# Patient Record
Sex: FEMALE | Race: WHITE | NOT HISPANIC OR LATINO | Employment: OTHER | URBAN - METROPOLITAN AREA
[De-identification: names, ages, dates, MRNs, and addresses within clinical notes are randomized per-mention and may not be internally consistent; named-entity substitution may affect disease eponyms.]

---

## 2020-09-08 ENCOUNTER — COMMUNICATION - HEALTHEAST (OUTPATIENT)
Dept: FAMILY MEDICINE | Facility: CLINIC | Age: 85
End: 2020-09-08

## 2020-09-09 ENCOUNTER — OFFICE VISIT - HEALTHEAST (OUTPATIENT)
Dept: FAMILY MEDICINE | Facility: CLINIC | Age: 85
End: 2020-09-09

## 2020-09-09 DIAGNOSIS — Z76.89 ENCOUNTER TO ESTABLISH CARE: ICD-10-CM

## 2020-09-09 DIAGNOSIS — R41.3 MEMORY DIFFICULTY: ICD-10-CM

## 2020-09-09 ASSESSMENT — MIFFLIN-ST. JEOR: SCORE: 1167.75

## 2020-11-16 ENCOUNTER — RECORDS - HEALTHEAST (OUTPATIENT)
Dept: ADMINISTRATIVE | Facility: OTHER | Age: 85
End: 2020-11-16

## 2020-11-24 ENCOUNTER — COMMUNICATION - HEALTHEAST (OUTPATIENT)
Dept: FAMILY MEDICINE | Facility: CLINIC | Age: 85
End: 2020-11-24

## 2020-12-03 ENCOUNTER — VIRTUAL VISIT (OUTPATIENT)
Dept: FAMILY MEDICINE | Facility: OTHER | Age: 85
End: 2020-12-03

## 2020-12-03 ENCOUNTER — AMBULATORY - HEALTHEAST (OUTPATIENT)
Dept: FAMILY MEDICINE | Facility: CLINIC | Age: 85
End: 2020-12-03

## 2020-12-03 DIAGNOSIS — Z20.822 SUSPECTED COVID-19 VIRUS INFECTION: ICD-10-CM

## 2020-12-03 NOTE — PROGRESS NOTES
"Date: 2020 10:24:01  Clinician: Jong Brantley  Clinician NPI: [id number]  Patient: Margarita Zimmerman  Patient : 1932  Patient Address: [facility] [place] [address]  Patient Phone: [phone]  Visit Protocol: URI  Patient Summary:  Margarita is a 88 year old ( : 1932 ) female who initiated a [facility] Visit for COVID-19 (Coronavirus) evaluation and screening. When asked the question \"Please sign me up to receive news, health information and promotions. \", Margarita responded \"No\".    When asked when her symptoms started, Margarita reported that she does not have any symptoms.   She denies taking antibiotic medication in the past month and having recent facial or sinus surgery in the past 60 days.    Pertinent COVID-19 (Coronavirus) information  Margarita does not work or volunteer as healthcare worker or a . In the past 14 days, Margarita has not worked or volunteered at a healthcare facility or group living setting.   In the past 14 days, she also has not lived in a congregate living setting.   Margarita has not had a close contact with a laboratory-confirmed COVID-19 patient within the last 14 days.    Since 2019, Margarita has not been tested for COVID-19 and has not had upper respiratory infection or influenza-like illness.   Pertinent medical history  She has not been told by her provider to avoid NSAIDs.   Margarita does not get yeast infections when she takes antibiotics.   Margarita does not have diabetes. She denies having immunosuppressive conditions (e.g., chemotherapy, HIV, organ transplant, long-term use of steroids or other immunosuppressive medications, splenectomy). She does not have severe COPD and congestive heart failure. She does not have asthma.   Margarita does not need a return to work/school note.   Weight: 160 lbs   Margarita does not smoke or use smokeless tobacco.   Weight: 160 lbs    MEDICATIONS: No current medications, ALLERGIES: NKDA  Clinician Response:  " Dear Margarita,   Based on your exposure to COVID-19 (coronavirus), we would like to test you for this virus.  1. Please call [phone] to schedule your visit. Explain that you were referred by [facility] to have a COVID-19 test. Be ready to share your [facility] visit ID number.  * If you need to schedule in [facility] please call [phone] or for [place] employees please call [phone].   * If you need to schedule in the [place] area please call [phone]. [place] employees call [phone].   The following will serve as your written order for this COVID Test, ordered by me, for the indication of suspected COVID [Z20.828]: The test will be ordered in [employer], our electronic health record, after you are scheduled. It will show as ordered and authorized by Maurizio Wei MD.  Order: COVID-19 (coronavirus) PCR for ASYMPTOMATIC EXPOSURE testing from [facility].   If you know you have had close contact with someone who tested positive, you should be quarantined for 14 days after this exposure. You should stay in quarantine for the14 days even if the covid test is negative, the optimal time to test after exposure is 5-7 days from the exposure  Quarantine means   What should I do?  For safety, it's very important to follow these rules. Do this for 14 days after the date you were last exposed to the virus..  Stay home and away from others. Don't go to school or anywhere else. Generally quarantine means staying home from work but there are some exceptions to this. Please contact your workplace.   No hugging, kissing or shaking hands.  Don't let anyone visit.  Cover your mouth and nose with a mask, tissue or washcloth to avoid spreading germs.  Wash your hands and face often. Use soap and water.  What are the symptoms of COVID-19?  The most common symptoms are cough, fever and trouble breathing. Less common symptoms include headache, body aches, fatigue (feeling very tired), chills, sore throat, stuffy or runny nose, diarrhea  (loose poop), loss of taste or smell, belly pain, and nausea or vomiting (feeling sick to your stomach or throwing up).  After 14 days, if you have still don't have symptoms, you likely don't have this virus.  If you develop symptoms, follow these guidelines.  If you're normally healthy: Please start another [facility] visit to report your symptoms. Go to [email or web address].  If you have a serious health problem (like cancer, heart failure, an organ transplant or kidney disease): Call your specialty clinic. Let them know that you might have COVID-19.  2. When it's time for your COVID test:  Stay at least 6 feet away from others. (If someone will drive you to your test, stay in the backseat, as far away from the  as you can.)  Cover your mouth and nose with a mask, tissue or washcloth.  Go straight to the testing site. Don't make any stops on the way there or back.  Please note  Caregivers in these groups are at risk for severe illness due to COVID-19:  o People 65 years and older  o People who live in a nursing home or long-term care facility  o People with chronic disease (lung, heart, cancer, diabetes, kidney, liver, immunologic)  o People who have a weakened immune system, including those who:  Are in cancer treatment  Take medicine that weakens the immune system, such as corticosteroids  Had a bone marrow or organ transplant  Have an immune deficiency  Have poorly controlled HIV or AIDS  Are obese (body mass index of 40 or higher)  Smoke regularly  Where can I get more information?  [facility] -- About COVID-19: [email or web address]/  CDC -- What to Do If You're Sick: www.cdc.gov/coronavirus/2019-ncov/about/steps-when-sick.html  CDC -- Ending Home Isolation: www.cdc.gov/coronavirus/2019-ncov/hcp/disposition-in-home-patients.html  CDC -- Caring for Someone: www.cdc.gov/coronavirus/2019-ncov/if-you-are-sick/care-for-someone.html  [facility] -- Interim Guidance for Hospital Discharge to Home:  www.health.[facility].[place]/diseases/coronavirus/hcp/hospdischarge.pdf  [facility] clinical trials (COVID-19 research studies): clinicalaffairs.[facility]/umn-clinical-trials  Below are the COVID-19 hotlines at the [facility] ([facility]). Interpreters are available.  For health questions: Call 510-238-4445 or 1-159.548.3647 (7 a.m. to 7 p.m.)  For questions about schools and childcare: Call 997-040-4150 or 1-783.587.2301 (7 a.m. to 7 p.m.)    Diagnosis: Contact with and (suspected) exposure to other viral communicable diseases  Diagnosis ICD: Z20.828

## 2020-12-04 ENCOUNTER — AMBULATORY - HEALTHEAST (OUTPATIENT)
Dept: FAMILY MEDICINE | Facility: CLINIC | Age: 85
End: 2020-12-04

## 2020-12-04 DIAGNOSIS — Z20.822 SUSPECTED COVID-19 VIRUS INFECTION: ICD-10-CM

## 2020-12-06 ENCOUNTER — COMMUNICATION - HEALTHEAST (OUTPATIENT)
Dept: SCHEDULING | Facility: CLINIC | Age: 85
End: 2020-12-06

## 2021-01-11 ENCOUNTER — RECORDS - HEALTHEAST (OUTPATIENT)
Dept: LAB | Facility: CLINIC | Age: 86
End: 2021-01-11

## 2021-01-12 LAB
ANION GAP SERPL CALCULATED.3IONS-SCNC: 7 MMOL/L (ref 5–18)
BUN SERPL-MCNC: 12 MG/DL (ref 8–28)
CALCIUM SERPL-MCNC: 8.3 MG/DL (ref 8.5–10.5)
CHLORIDE BLD-SCNC: 105 MMOL/L (ref 98–107)
CO2 SERPL-SCNC: 25 MMOL/L (ref 22–31)
CREAT SERPL-MCNC: 0.62 MG/DL (ref 0.6–1.1)
ERYTHROCYTE [DISTWIDTH] IN BLOOD BY AUTOMATED COUNT: 13.2 % (ref 11–14.5)
GFR SERPL CREATININE-BSD FRML MDRD: >60 ML/MIN/1.73M2
GLUCOSE BLD-MCNC: 69 MG/DL (ref 70–125)
HCT VFR BLD AUTO: 40.5 % (ref 35–47)
HGB BLD-MCNC: 13.4 G/DL (ref 12–16)
MCH RBC QN AUTO: 30.7 PG (ref 27–34)
MCHC RBC AUTO-ENTMCNC: 33.1 G/DL (ref 32–36)
MCV RBC AUTO: 93 FL (ref 80–100)
PLATELET # BLD AUTO: 393 THOU/UL (ref 140–440)
PMV BLD AUTO: 10.5 FL (ref 8.5–12.5)
POTASSIUM BLD-SCNC: 4.1 MMOL/L (ref 3.5–5)
RBC # BLD AUTO: 4.37 MILL/UL (ref 3.8–5.4)
SODIUM SERPL-SCNC: 137 MMOL/L (ref 136–145)
TSH SERPL DL<=0.005 MIU/L-ACNC: 1.5 UIU/ML (ref 0.3–5)
VIT B12 SERPL-MCNC: 824 PG/ML (ref 213–816)
WBC: 6.8 THOU/UL (ref 4–11)

## 2021-03-10 ENCOUNTER — HOSPITAL ENCOUNTER (EMERGENCY)
Facility: CLINIC | Age: 86
Discharge: HOME | End: 2021-03-10
Attending: EMERGENCY MEDICINE | Admitting: EMERGENCY MEDICINE
Payer: COMMERCIAL

## 2021-03-10 VITALS
TEMPERATURE: 98 F | RESPIRATION RATE: 18 BRPM | OXYGEN SATURATION: 96 % | HEIGHT: 67 IN | SYSTOLIC BLOOD PRESSURE: 117 MMHG | WEIGHT: 179 LBS | HEART RATE: 87 BPM | BODY MASS INDEX: 28.09 KG/M2 | DIASTOLIC BLOOD PRESSURE: 65 MMHG

## 2021-03-10 DIAGNOSIS — R00.2 PALPITATIONS: ICD-10-CM

## 2021-03-10 DIAGNOSIS — R42 EPISODE OF DIZZINESS: ICD-10-CM

## 2021-03-10 LAB
ALBUMIN UR-MCNC: 30 MG/DL
ANION GAP SERPL CALCULATED.3IONS-SCNC: 4 MMOL/L (ref 3–14)
APPEARANCE UR: CLEAR
BASOPHILS # BLD AUTO: 0 10E9/L (ref 0–0.2)
BASOPHILS NFR BLD AUTO: 0.6 %
BILIRUB UR QL STRIP: NEGATIVE
BUN SERPL-MCNC: 22 MG/DL (ref 7–30)
CALCIUM SERPL-MCNC: 8.8 MG/DL (ref 8.5–10.1)
CHLORIDE SERPL-SCNC: 107 MMOL/L (ref 94–109)
CO2 SERPL-SCNC: 31 MMOL/L (ref 20–32)
COLOR UR AUTO: YELLOW
CREAT SERPL-MCNC: 0.79 MG/DL (ref 0.52–1.04)
D DIMER PPP FEU-MCNC: 0.7 UG/ML FEU (ref 0–0.5)
DIFFERENTIAL METHOD BLD: NORMAL
EOSINOPHIL # BLD AUTO: 0.2 10E9/L (ref 0–0.7)
EOSINOPHIL NFR BLD AUTO: 3.3 %
ERYTHROCYTE [DISTWIDTH] IN BLOOD BY AUTOMATED COUNT: 12.8 % (ref 10–15)
GFR SERPL CREATININE-BSD FRML MDRD: 67 ML/MIN/{1.73_M2}
GLUCOSE BLDC GLUCOMTR-MCNC: 69 MG/DL (ref 70–99)
GLUCOSE BLDC GLUCOMTR-MCNC: 90 MG/DL (ref 70–99)
GLUCOSE BLDC GLUCOMTR-MCNC: 91 MG/DL (ref 70–99)
GLUCOSE SERPL-MCNC: 65 MG/DL (ref 70–99)
GLUCOSE UR STRIP-MCNC: NEGATIVE MG/DL
HCT VFR BLD AUTO: 46.4 % (ref 35–47)
HGB BLD-MCNC: 15 G/DL (ref 11.7–15.7)
HGB UR QL STRIP: NEGATIVE
IMM GRANULOCYTES # BLD: 0 10E9/L (ref 0–0.4)
IMM GRANULOCYTES NFR BLD: 0.3 %
INTERPRETATION ECG - MUSE: NORMAL
KETONES UR STRIP-MCNC: NEGATIVE MG/DL
LEUKOCYTE ESTERASE UR QL STRIP: ABNORMAL
LYMPHOCYTES # BLD AUTO: 2 10E9/L (ref 0.8–5.3)
LYMPHOCYTES NFR BLD AUTO: 27.9 %
MCH RBC QN AUTO: 29.9 PG (ref 26.5–33)
MCHC RBC AUTO-ENTMCNC: 32.3 G/DL (ref 31.5–36.5)
MCV RBC AUTO: 92 FL (ref 78–100)
MONOCYTES # BLD AUTO: 0.6 10E9/L (ref 0–1.3)
MONOCYTES NFR BLD AUTO: 7.9 %
MUCOUS THREADS #/AREA URNS LPF: PRESENT /LPF
NEUTROPHILS # BLD AUTO: 4.3 10E9/L (ref 1.6–8.3)
NEUTROPHILS NFR BLD AUTO: 60 %
NITRATE UR QL: NEGATIVE
NRBC # BLD AUTO: 0 10*3/UL
NRBC BLD AUTO-RTO: 0 /100
PH UR STRIP: 6 PH (ref 5–7)
PLATELET # BLD AUTO: 382 10E9/L (ref 150–450)
POTASSIUM SERPL-SCNC: 4 MMOL/L (ref 3.4–5.3)
RBC # BLD AUTO: 5.02 10E12/L (ref 3.8–5.2)
RBC #/AREA URNS AUTO: 3 /HPF (ref 0–2)
SODIUM SERPL-SCNC: 142 MMOL/L (ref 133–144)
SOURCE: ABNORMAL
SP GR UR STRIP: 1.03 (ref 1–1.03)
SQUAMOUS #/AREA URNS AUTO: 3 /HPF (ref 0–1)
TROPONIN I SERPL-MCNC: <0.015 UG/L (ref 0–0.04)
UROBILINOGEN UR STRIP-MCNC: 0 MG/DL (ref 0–2)
WBC # BLD AUTO: 7.2 10E9/L (ref 4–11)
WBC #/AREA URNS AUTO: 6 /HPF (ref 0–5)

## 2021-03-10 PROCEDURE — 85379 FIBRIN DEGRADATION QUANT: CPT | Performed by: EMERGENCY MEDICINE

## 2021-03-10 PROCEDURE — 80048 BASIC METABOLIC PNL TOTAL CA: CPT | Performed by: EMERGENCY MEDICINE

## 2021-03-10 PROCEDURE — 258N000003 HC RX IP 258 OP 636: Performed by: EMERGENCY MEDICINE

## 2021-03-10 PROCEDURE — 999N001017 HC STATISTIC GLUCOSE BY METER IP

## 2021-03-10 PROCEDURE — 81001 URINALYSIS AUTO W/SCOPE: CPT | Performed by: EMERGENCY MEDICINE

## 2021-03-10 PROCEDURE — 85025 COMPLETE CBC W/AUTO DIFF WBC: CPT | Performed by: EMERGENCY MEDICINE

## 2021-03-10 PROCEDURE — 84484 ASSAY OF TROPONIN QUANT: CPT | Performed by: EMERGENCY MEDICINE

## 2021-03-10 PROCEDURE — 99284 EMERGENCY DEPT VISIT MOD MDM: CPT | Mod: 25

## 2021-03-10 PROCEDURE — 87086 URINE CULTURE/COLONY COUNT: CPT | Performed by: EMERGENCY MEDICINE

## 2021-03-10 PROCEDURE — 96360 HYDRATION IV INFUSION INIT: CPT

## 2021-03-10 PROCEDURE — 93005 ELECTROCARDIOGRAM TRACING: CPT

## 2021-03-10 RX ORDER — VITAMIN E 268 MG
400 CAPSULE ORAL
COMMUNITY
Start: 2021-01-08

## 2021-03-10 RX ORDER — GLUCOSAMINE/CHONDR SU A SOD 750-600 MG
1 TABLET ORAL
COMMUNITY
Start: 2021-01-08

## 2021-03-10 RX ORDER — MULTIVITAMIN,THER AND MINERALS
1 TABLET ORAL
COMMUNITY
Start: 2021-03-04

## 2021-03-10 RX ADMIN — SODIUM CHLORIDE 500 ML: 9 INJECTION, SOLUTION INTRAVENOUS at 11:45

## 2021-03-10 ASSESSMENT — ENCOUNTER SYMPTOMS
HEADACHES: 0
PALPITATIONS: 1
SHORTNESS OF BREATH: 1
DIZZINESS: 1
LIGHT-HEADEDNESS: 1

## 2021-03-10 ASSESSMENT — MIFFLIN-ST. JEOR: SCORE: 1274.57

## 2021-03-10 NOTE — ED PROVIDER NOTES
"  History   Chief Complaint:  Dizziness     The history is provided by the patient, the nursing home and the EMS personnel.      Margarita Zimmerman is a 88 year old female with history of SVT who presents with dizziness. The staff at the patient's assisted living facility states that the patient had a bowel movement then called for the nurse while in the bathroom. She was found to be dizzy, short of breath, tachycardiac up to 150 with positive orthostatic pressures. The patient did not want to be left alone. EMS reports normal vitals and negative orthostatics.The patient herself describes having had the dizziness as an unsteady sensation but denies a room spinning sensation. She is unsure if she could walk steady at this time. She states that this lasted for a few minutes. She reports some associated palpitations. She denies any chest pain, vision changes, headaches, numbness or weakness in the extremities or any other symptoms.     Review of Systems   Eyes: Negative for visual disturbance.   Respiratory: Positive for shortness of breath.    Cardiovascular: Positive for palpitations. Negative for chest pain.   Neurological: Positive for dizziness and light-headedness. Negative for headaches.   All other systems reviewed and are negative.    Allergies:  The patient has no known allergies.     Medications:  The patient is not currently taking any prescribed medications.    Past Medical History:    SVT   Skin cancer      Past Surgical History:    Cholecystectomy   Tubal ligation   Uterine fibroid tumor     Family History:    MI- father   Pancreatic cancer- mother   Dementia- brother   Cancer- daughter     Social History:  Lives in assisted living  Holistic medicine     Physical Exam     Patient Vitals for the past 24 hrs:   BP Temp Temp src Pulse Resp SpO2 Height Weight   03/10/21 1104 127/77 98  F (36.7  C) Oral 90 18 99 % 1.702 m (5' 7\") 81.2 kg (179 lb)       Physical Exam  VS: Reviewed per above  HENT: Mucous membranes " moist, no nuchal rigidity  EYES: sclera anicteric  CV: Rate as noted, regular rhythm.   RESP: Effort normal. Breath sounds are normal bilaterally.  GI: no tenderness/rebound/guarding, not distended.  NEURO: GCS 15, cranial nerves II through XII are intact, 5 out of 5 strength in all 4 extremities, sensation is intact light touch in all 4 extremities. No ataxia, negative romberg.  MSK: No deformity of the extremities  SKIN: Warm and dry        Emergency Department Course   ECG  ECG taken at 1145, ECG read at 1151  Sinus rhythm with 1st degree AV block with premature atrial complexes   Left axis deviation   Left bundle branch blockage   Rate 78 bpm. NE interval 224 ms. QRS duration 140 ms. QT/QTc 400/456 ms. P-R-T axes 63 -51 87.     Laboratory:     CBC: WBC 7.2, HGB 15.0,      BMP: glucose 65 (L) o/w WNL (Creatinine 0.79)     Troponin (Collected 1111): <0.015    D dimer: 0.7     UA with microscopic: protein albumin 30 (A) leukocyte esterase small (A) WBC/HPF 6 (H) RBC/HPF 3 (H) squamous epithelial/HPF 3 (H) mucus present (A)  o/w WNL     Glucose by meter 1204: 69 (L)     Glucose by meter 1223: 90     Glucose by meter 1246: 91     Procedures    Emergency Department Course:    Reviewed:  I reviewed nursing notes, vitals, past medical history and care everywhere    Assessments:  1118 I obtained history and examined the patient as noted above.     1131 I returned to check on patient.  I asked the patient about her code status and possible interventions.     1139 I returned to check on patient.      1213 I returned to check on patient and explained findings prior to discharge.     1400 I spoke with the patient's son Keny regarding patient's presentation, findings, and plan of care.     Interventions:  1145  mL     Disposition:  The patient was discharged to home.       Impression & Plan     CMS Diagnoses: None    Medical Decision Making:  Patient presents to the ER for evaluation of episode of dizziness and  "palpitations and possible shortness of breath while in the toilet this morning.  On arrival vital signs are reassuring.  No evidence of orthostatic vital sign changes.  No ataxia or other neurological deficits.  At this time occult stroke seems less likely.  Patient is actually feeling well here in the ER.  I reviewed her paperwork and see that she has a POLST form that documents patient would only want comfort focused measures.  I discussed this with patient and she was agreeable to some basic laboratory evaluation.  EKG and troponin do not suggest ACS.  No dysrhythmia while on telemetry here in the ER.  Although certainly occult dysrhythmia could have been the cause of her underlying episode this morning.  D-dimer is slightly elevated but by age-adjusted criteria, she is low risk for underlying PE.  Additionally there is no ongoing shortness of breath or hypoxemia or tachycardia.  Stable hemoglobin and no reports to suggest ongoing blood loss anywhere.  UA is not definitive for infection but it was sent for culture.  Patient had borderline glucose but that improved with eating here in the ER.  Discussed further testing such as neuroimaging or a Zio patch monitoring or admission to the hospital, but patient declined stating that she is a firm believer in \"reflexology\" and does not see a doctor or take any medications.  I also spoke with her son who deferred any medical decisions to the patient.  Ultimately patient was discharged back to her facility.      Covid-19  Margarita Zimmerman was evaluated during a global COVID-19 pandemic, which necessitated consideration that the patient might be at risk for infection with the SARS-CoV-2 virus that causes COVID-19.   Applicable protocols for evaluation were followed during the patient's care.     Diagnosis:    ICD-10-CM    1. Palpitations  R00.2 Glucose by meter     Glucose by meter   2. Episode of dizziness  R42        Scribe Disclosure:  I, Jackelyn Anders, am serving as a " scribe at 11:23 AM on 3/10/2021 to document services personally performed by Fletcher Adame MD based on my observations and the provider's statements to me.              Fletcher Adame MD  03/10/21 1524

## 2021-03-10 NOTE — ED NOTES
BG 91 on recheck. Patient is not a diabetic and is eating and drinking. MD notified and 9 is acceptable for this patient. Will discharge back to facility.

## 2021-03-10 NOTE — ED NOTES
Patient picked up by [facility]. The  did not tell anyone from the nursing staff that they were picking the patient up. Verified via [facility] dispatch that patient was in fact picked up and brought home. They confirmed.

## 2021-03-10 NOTE — DISCHARGE INSTRUCTIONS
Discharge Instructions  Dizziness (Lightheaded)  Today you were seen for dizziness.  Dizziness can be caused by many things and it can be very difficult to determine the cause of dizziness.  At this time, your provider has found no signs that your dizziness is due to a serious or life-threatening condition. However, sometimes there is a serious problem that does not show up right away, and it is important for you to follow up with your regular provider as instructed.  Generally, every Emergency Department visit should have a follow-up clinic visit with either a primary or a specialty clinic/provider. Please follow-up as instructed by your emergency provider today.      Return to the Emergency Department if:    You pass out (fainting or falling out), especially during exercise.    You develop chest pain, chest pressure or difficulty breathing.  Your feel an irregular heartbeat.  You have excessive vaginal bleeding, or blood in your stool or vomit (throw up).  You have a high fever.  Your symptoms get worse or more frequent.    If when you begin to feel dizzy or lightheaded, it is important to sit down or lay down immediately to prevent injury from falling.  If you were given a prescription for medicine here today, be sure to read all of the information (including the package insert) that comes with your prescription.  This will include important information about the medicine, its side effects, and any warnings that you need to know about.  The pharmacist who fills the prescription can provide more information and answer questions you may have about the medicine.  If you have questions or concerns that the pharmacist cannot address, please call or return to the Emergency Department.   Remember that you can always come back to the Emergency Department if you are not able to see your regular provider in the amount of time listed above, if you get any new symptoms, or if there is anything that worries you.    Discharge  Instructions  Palpitations    Palpitations are an unusual awareness of your heartbeat. People often describe this as the heart skipping, fluttering, racing, irregular, or pounding. At this time, your provider has found no signs that your palpitations are due to a serious or life-threatening condition. However, sometimes there is a serious problem that does not show up right away.    Palpitations can be caused by caffeine, cigarettes, diet pills, energy drinks or supplements, other stimulants, and medications and street drugs. They can also be caused by anxiety, hormone conditions such as high thyroid, and other medical conditions. Sometimes they are a sign of abnormal rhythm in the heart. At this time, your provider did not find any dangerous cause of your symptoms.    Generally, every Emergency Department visit should have a follow-up clinic visit with either a primary or a specialty clinic/provider. Please follow-up as instructed by your emergency provider today.    Return to the Emergency Department if:  You get chest pain or tightness.  You are short of breath.  You get very weak or tired.  You pass out or faint.  Your heart rate is over 120 beats per minute for more than 10 minutes while you are resting.   You have anything else that worries you.    What can I do to help myself?  Fill any prescriptions the provider gave you and take them right away.   Follow your provider s instructions about the prescription medicines you are on. Sometimes the provider may tell you to stop taking a medicine or change the dose.  If you smoke, this may be a good time to quit! The less you can smoke, the better.  Do not use energy drinks, diet pills, or stimulants. Limit your use of caffeine.  If you were given a prescription for medicine here today, be sure to read all of the information (including the package insert) that comes with your prescription.  This will include important information about the medicine, its side  effects, and any warnings that you need to know about.  The pharmacist who fills the prescription can provide more information and answer questions you may have about the medicine.  If you have questions or concerns that the pharmacist cannot address, please call or return to the Emergency Department.     Remember that you can always come back to the Emergency Department if you are not able to see your regular provider in the amount of time listed above, if you get any new symptoms, or if there is anything that worries you.

## 2021-03-10 NOTE — PLAN OF CARE
I called and spoke with Gricelda at [facility] and gave report on patient.

## 2021-03-10 NOTE — ED NOTES
Bed: ED19  Expected date:   Expected time:   Means of arrival:   Comments:  [facility] - 514 - 88 F dizzy no covid eta 1044

## 2021-03-10 NOTE — ED NOTES
Patient given 4oz apple juice with 1 sugar packet in it and 3 yas crackers and 2 saltines. Patient ate 100% of the food. Also had additional 4oz apple juice. MD notified of BG. Protocol followed.

## 2021-03-11 LAB
BACTERIA SPEC CULT: NORMAL
SPECIMEN SOURCE: NORMAL

## 2021-06-04 VITALS
HEART RATE: 76 BPM | RESPIRATION RATE: 12 BRPM | WEIGHT: 160.8 LBS | BODY MASS INDEX: 26.79 KG/M2 | SYSTOLIC BLOOD PRESSURE: 120 MMHG | HEIGHT: 65 IN | DIASTOLIC BLOOD PRESSURE: 70 MMHG

## 2021-06-11 NOTE — PROGRESS NOTES
Assessment / Impression     1. Encounter to establish care     2. Memory difficulty           Plan:     She appears to be in good physical health.  However, there is some evidence of mild-moderate cognitive impairment.  I think it would be appropriate for her to transition to an assisted living.  I do not think her memory issues are severe enough that she needs to be in a memory care unit at this time.  She is currently living independently and doing fairly well for the most part.  She gets some help from her son who has been managing her finances and helps with grocery shopping.  They are planning to forward any paperwork from the assisted living facility for me to fill out.  I offered to check screening labs for her since she has not seen a doctor in approximately 40 years, but she declined.  I also offered immunizations (PCV-13, Tdap, Shingrix and influenza), but she declined these as well.    Subjective:      HPI: Margarita Zimmerman is a 87 y.o. female who presents to the clinic to establish care.  Her son, Keny, is present.  She denies having any significant medical problems in her history.  She reports not seeing a doctor in about 40 years.  Overall, she is feeling healthy.  She has had some issues with confusion, disorientation and difficulty with short-term memory as she gets older.  She is still living independently in a senior living facility.  She admits that things have been difficult and she has been feeling more isolated since the onset of the coronavirus pandemic.  Occasionally she has woken up disoriented and her son reports that she often repeats stories and has difficulty with short-term memory.  She has had some issues in which she is left food on the stove without turning it off as well.  She is currently on a waiting list to move into an assisted living facility.  She and the family do not think that she needs to be in a memory care unit.  There have not been any significant issues related to  "memory in terms of wandering or getting lost.  There have not been any issues with aggression or significant behavior problems.        Review of Systems  All other systems reviewed and are negative.     Social History     Tobacco Use   Smoking Status Never Smoker   Smokeless Tobacco Never Used       Family History   Problem Relation Age of Onset     Pancreatic cancer Mother      Cancer Daughter      Dementia Sister      Dementia Brother        Objective:     /70 (Patient Site: Right Arm, Patient Position: Sitting, Cuff Size: Adult Regular)   Pulse 76   Resp 12   Ht 5' 5.47\" (1.663 m)   Wt 160 lb 12.8 oz (72.9 kg)   BMI 26.37 kg/m    Physical Examination: General appearance - alert, well appearing, and in no distress  Eyes: pupils equal and reactive, extraocular eye movements intact  Mouth: mucous membranes moist, pharynx normal without lesions  Neck: supple, no significant adenopathy  Lungs: clear to auscultation, no wheezes, rales or rhonchi, symmetric air entry  Heart: normal rate, regular rhythm, normal S1, S2, no murmurs, rubs, clicks or gallops  Abdomen: soft, nontender, nondistended, no masses or organomegaly  Neurological: alert, normal speech, no focal findings or movement disorder noted.  She has some difficulty with short-term memory.  She was not able to tell me the day of the week or month.  She does know the year.  She was able to partially draw a clock, but circled the numbers instead of using the hands of the clock.  She was not able to recall 3 words when asked.  Extremities: No edema, no clubbing or cyanosis  Psychiatric: Normal affect. Does not appear anxious or depressed.  She appears pleasant and is able to converse normally.  She repeated some things in her history at the end of the appointment that she had already mentioned at the beginning of the appointment.    No results found for this or any previous visit (from the past 168 hour(s)).    Current Outpatient Medications "   Medication Sig     MULTIVITAMIN ORAL Take by mouth daily. Off and on.

## 2021-06-11 NOTE — TELEPHONE ENCOUNTER
I spoke to her son, Keny, by phone.  He will let me know that she has not seen a doctor in about 40 years.  She is on a waiting list to move into an assisted living facility and needs to have an exam stating that it would be appropriate for her to go to the assisted living facility rather than a memory care unit.  He reports that she has been losing some short-term memory and occasionally wakes up disoriented, but he does not feel like she needs to be in a memory care unit.  She has been living independently and there have been some issues in which she has left things on her stove while it is still on.  I asked him to bring any paperwork they may need from the assisted living facility to the appointment tomorrow.

## 2021-06-11 NOTE — TELEPHONE ENCOUNTER
Upcoming Appointment Question  When is the appointment: Tomorrow  What is your appointment for?: New patient cognitive changes per appointment notes  Who is your appointment scheduled with?: Dr. Neida Kaplan  What is your question/concern?: Patient's son, Keny, stated he wanted a call back from Dr. Neida Kaplan, to discus the reason why the patient is coming in tomorrow. Caller stated it has been a long time since the patient has seen a medical doctor. Caller stated he wanted to fill Dr. Neida Kaplan in on a few things before the patient was seen.  Okay to leave a detailed message?: No [phone]

## 2021-06-13 NOTE — TELEPHONE ENCOUNTER
Forms Request  Name of form/paperwork: Other:  Cognative assessement for [facility] living admission   Have you been seen for this request: N/A  Do we have the form: Yes- 09/09/20  When is form needed by: as soon as possible today since they are being seen tomorrow.   How would you like the form returned: Fax:  [phone]  Patient Notified form requests are processed in 3-5 business days: no  Okay to leave a detailed message? Yes

## 2021-06-13 NOTE — TELEPHONE ENCOUNTER
You may send them the last office visit note with me that provides the details a are looking for.  Thank you, DE

## 2021-06-16 PROBLEM — R41.3 MEMORY DIFFICULTY: Status: ACTIVE | Noted: 2020-11-17

## 2022-02-23 ENCOUNTER — LAB REQUISITION (OUTPATIENT)
Dept: LAB | Facility: CLINIC | Age: 87
End: 2022-02-23
Payer: COMMERCIAL

## 2022-02-23 DIAGNOSIS — R41.0 DISORIENTATION, UNSPECIFIED: ICD-10-CM

## 2022-02-23 LAB
ALBUMIN UR-MCNC: NEGATIVE MG/DL
APPEARANCE UR: CLEAR
BILIRUB UR QL STRIP: NEGATIVE
COLOR UR AUTO: ABNORMAL
GLUCOSE UR STRIP-MCNC: NEGATIVE MG/DL
HGB UR QL STRIP: NEGATIVE
KETONES UR STRIP-MCNC: NEGATIVE MG/DL
LEUKOCYTE ESTERASE UR QL STRIP: NEGATIVE
MUCOUS THREADS #/AREA URNS LPF: PRESENT /LPF
NITRATE UR QL: NEGATIVE
PH UR STRIP: 6.5 [PH] (ref 5–7)
RBC URINE: <1 /HPF
SP GR UR STRIP: 1.01 (ref 1–1.03)
SQUAMOUS EPITHELIAL: 1 /HPF
UROBILINOGEN UR STRIP-MCNC: <2 MG/DL
WBC URINE: 1 /HPF

## 2022-02-23 PROCEDURE — 81001 URINALYSIS AUTO W/SCOPE: CPT | Mod: ORL | Performed by: NURSE PRACTITIONER
